# Patient Record
Sex: MALE | URBAN - METROPOLITAN AREA
[De-identification: names, ages, dates, MRNs, and addresses within clinical notes are randomized per-mention and may not be internally consistent; named-entity substitution may affect disease eponyms.]

---

## 2019-08-30 ENCOUNTER — HOSPITAL ENCOUNTER (EMERGENCY)
Age: 29
Discharge: LEFT BEFORE DISCHARGE | End: 2019-08-30
Attending: EMERGENCY MEDICINE

## 2019-08-30 VITALS
OXYGEN SATURATION: 95 % | HEIGHT: 68 IN | RESPIRATION RATE: 20 BRPM | DIASTOLIC BLOOD PRESSURE: 74 MMHG | BODY MASS INDEX: 45.47 KG/M2 | HEART RATE: 110 BPM | TEMPERATURE: 97.8 F | WEIGHT: 300 LBS | SYSTOLIC BLOOD PRESSURE: 135 MMHG

## 2019-08-30 DIAGNOSIS — T40.601A OPIATE OVERDOSE, ACCIDENTAL OR UNINTENTIONAL, INITIAL ENCOUNTER (HCC): Primary | ICD-10-CM

## 2019-08-30 PROCEDURE — 99283 EMERGENCY DEPT VISIT LOW MDM: CPT

## 2019-08-31 ASSESSMENT — ENCOUNTER SYMPTOMS
ABDOMINAL PAIN: 0
SHORTNESS OF BREATH: 0
BACK PAIN: 0
WHEEZING: 0

## 2019-08-31 NOTE — ED NOTES
Pt brought to ED by [facility] for OD. Per EMS, pt was given 4 mg Narcan IN. Pt snorted fentanyl and cocaine. Pt is staying at [facility]. Pt denies any pain. Pt denies any medical history. Pt not willing to answer any other questions at this time.      Lalo Rowell RN  08/30/19 2017

## 2019-08-31 NOTE — ED PROVIDER NOTES
reviewed. DIFFERENTIAL  DIAGNOSIS     PLAN (LABS / IMAGING / EKG):  No orders of the defined types were placed in this encounter. MEDICATIONS ORDERED:  No orders of the defined types were placed in this encounter. DDX: Opiate overdose, other overdose,    DIAGNOSTIC RESULTS / DEPARTMENT COURSE / MDM     LABS:  No results found for this visit on 08/30/19. IMPRESSION: Pt is a 73-year-old male who presents with EMS after suspected overdose. Patient has no medical complaints at this time. Will monitor patient while in the ER and likely discharge home. RADIOLOGY:  No results found. EKG  None    All EKG's are interpreted by the Emergency Department Physician who either signs or Co-signsthis chart in the absence of a cardiologist.    EMERGENCY DEPARTMENT COURSE:   Genene December to re-evaluate patient. Patient eloped. PROCEDURES:  None    CONSULTS:  None      FINAL IMPRESSION      1. Opiate overdose, accidental or unintentional, initial encounter ([facility] [address]          DISPOSITION / PLAN     DISPOSITION Eloped - Left Before Treatment Complete    PATIENT REFERRED TO:  No follow-up provider specified. DISCHARGE MEDICATIONS:  There are no discharge medications for this patient.       Georgi Ellis DO  Emergency Medicine Resident    (Please note thatportions of this note were completed with a voice recognition program.  Efforts were made to edit the dictations but occasionally words are mis-transcribed.)       Georgi Ellis DO  Resident  08/31/19 [id number]